# Patient Record
Sex: MALE | URBAN - METROPOLITAN AREA
[De-identification: names, ages, dates, MRNs, and addresses within clinical notes are randomized per-mention and may not be internally consistent; named-entity substitution may affect disease eponyms.]

---

## 2017-10-12 ENCOUNTER — IMPORTED ENCOUNTER (OUTPATIENT)
Dept: URBAN - METROPOLITAN AREA CLINIC 9 | Facility: CLINIC | Age: 62
End: 2017-10-12

## 2018-06-18 ENCOUNTER — IMPORTED ENCOUNTER (OUTPATIENT)
Dept: URBAN - METROPOLITAN AREA CLINIC 9 | Facility: CLINIC | Age: 63
End: 2018-06-18

## 2019-07-03 ENCOUNTER — IMPORTED ENCOUNTER (OUTPATIENT)
Dept: URBAN - METROPOLITAN AREA CLINIC 9 | Facility: CLINIC | Age: 64
End: 2019-07-03

## 2020-07-21 ENCOUNTER — IMPORTED ENCOUNTER (OUTPATIENT)
Dept: URBAN - METROPOLITAN AREA CLINIC 9 | Facility: CLINIC | Age: 65
End: 2020-07-21

## 2020-08-06 ENCOUNTER — IMPORTED ENCOUNTER (OUTPATIENT)
Dept: URBAN - METROPOLITAN AREA CLINIC 9 | Facility: CLINIC | Age: 65
End: 2020-08-06

## 2020-08-25 ENCOUNTER — IMPORTED ENCOUNTER (OUTPATIENT)
Dept: URBAN - METROPOLITAN AREA CLINIC 9 | Facility: CLINIC | Age: 65
End: 2020-08-25

## 2021-01-05 ENCOUNTER — IMPORTED ENCOUNTER (OUTPATIENT)
Dept: URBAN - METROPOLITAN AREA CLINIC 9 | Facility: CLINIC | Age: 66
End: 2021-01-05

## 2021-10-18 ASSESSMENT — TONOMETRY
OS_IOP_MMHG: 18
OS_IOP_MMHG: 14
OD_IOP_MMHG: 14
OD_IOP_MMHG: 12
OD_IOP_MMHG: 13
OS_IOP_MMHG: 14
OD_IOP_MMHG: 13
OD_IOP_MMHG: 17
OS_IOP_MMHG: 14
OD_IOP_MMHG: 13
OS_IOP_MMHG: 13

## 2021-10-18 ASSESSMENT — VISUAL ACUITY
OD_SC: 20/50 SN
OD_SC: 20/25 SN
OD_CC: 20/20 SN
OS_CC: 20/20 SN
OD_CC: 20/25 SN
OS_CC: 20/25 SN
OD_CC: 20/25 SN
OS_SC: 20/30 SN
OS_SC: 20/25 SN
OS_SC: 20/20 SN
OD_SC: 20/30 SN
OD_CC: 20/40 SN
OD_SC: 20/30 SN
OS_CC: 20/20 SN
OS_SC: 20/25 SN
OS_CC: 20/20 SN
OD_CC: 20/25 SN
OD_CC: 20/20 SN
OD_SC: 20/25 SN
OS_CC: 20/20 SN
OD_SC: 20/20 - SN
OD_SC: 20/25 SN

## 2021-10-18 ASSESSMENT — KERATOMETRY
OS_AXISANGLE2_DEGREES: 122
OD_AXISANGLE_DEGREES: 176
OS_K1POWER_DIOPTERS: 39
OD_K2POWER_DIOPTERS: 39.5
OS_K2POWER_DIOPTERS: 69.75
OD_K1POWER_DIOPTERS: 38.75
OD_K2POWER_DIOPTERS: 40
OD_AXISANGLE2_DEGREES: 98
OS_AXISANGLE_DEGREES: 32
OD_K1POWER_DIOPTERS: 39
OS_K1POWER_DIOPTERS: 39
OS_K2POWER_DIOPTERS: 39.5
OS_AXISANGLE_DEGREES: 176
OD_AXISANGLE2_DEGREES: 93
OD_K2POWER_DIOPTERS: 39.75
OS_K1POWER_DIOPTERS: 38.75
OD_K2POWER_DIOPTERS: 39.75
OD_AXISANGLE_DEGREES: 3
OD_AXISANGLE_DEGREES: 12
OD_AXISANGLE2_DEGREES: 97
OD_K1POWER_DIOPTERS: 38.5
OD_K2POWER_DIOPTERS: 40.25
OD_K1POWER_DIOPTERS: 38.75
OD_AXISANGLE2_DEGREES: 86
OD_AXISANGLE_DEGREES: 8
OS_K1POWER_DIOPTERS: 38.75
OS_AXISANGLE2_DEGREES: 106
OS_K2POWER_DIOPTERS: 39.25
OD_K1POWER_DIOPTERS: 39
OD_AXISANGLE2_DEGREES: 102
OS_AXISANGLE2_DEGREES: 87
OS_K2POWER_DIOPTERS: 39
OD_AXISANGLE_DEGREES: 7
OS_AXISANGLE_DEGREES: 177
OS_AXISANGLE_DEGREES: 22
OS_AXISANGLE2_DEGREES: 112
OS_AXISANGLE2_DEGREES: 86
OS_K1POWER_DIOPTERS: 38.75
OS_AXISANGLE_DEGREES: 16
OS_K2POWER_DIOPTERS: 39.25

## 2022-04-13 NOTE — PROCEDURE NOTE: SURGICAL
"<p style=""margin-bottom:0in;text-align:justify;line-height:normal;""><a name=""[id number]""><span>Prior to commencing surgery

## 2022-05-23 NOTE — PATIENT DISCUSSION
ANGLES IMPROVED TO GRADE 2. PT UNDERSTANDS THAT ANGLE CAN GET MORE NARROW AS CARARACT WORSENS. S&S OF ANGLE CLOSURE REVIEWED. PT TO CALL ASAP IF THESE OCCUR.

## 2022-07-07 RX ORDER — ASPIRIN 81 MG/1
TABLET, CHEWABLE ORAL
COMMUNITY

## 2022-07-07 RX ORDER — ALBUTEROL SULFATE 90 UG/1
AEROSOL, METERED RESPIRATORY (INHALATION)
COMMUNITY

## 2022-07-07 RX ORDER — TELMISARTAN 80 MG/1
TABLET ORAL
COMMUNITY

## 2022-07-07 RX ORDER — SITAGLIPTIN AND METFORMIN HYDROCHLORIDE 50; 500 MG/1; MG/1
TABLET, FILM COATED, EXTENDED RELEASE ORAL
COMMUNITY